# Patient Record
Sex: MALE
[De-identification: names, ages, dates, MRNs, and addresses within clinical notes are randomized per-mention and may not be internally consistent; named-entity substitution may affect disease eponyms.]

---

## 2022-11-03 ENCOUNTER — DASHBOARD ENCOUNTERS (OUTPATIENT)
Age: 82
End: 2022-11-03

## 2022-11-03 ENCOUNTER — OFFICE VISIT (OUTPATIENT)
Dept: URBAN - METROPOLITAN AREA CLINIC 128 | Facility: CLINIC | Age: 82
End: 2022-11-03
Payer: COMMERCIAL

## 2022-11-03 VITALS
HEIGHT: 70 IN | DIASTOLIC BLOOD PRESSURE: 96 MMHG | BODY MASS INDEX: 31.5 KG/M2 | SYSTOLIC BLOOD PRESSURE: 146 MMHG | WEIGHT: 220 LBS | HEART RATE: 80 BPM | TEMPERATURE: 97.8 F

## 2022-11-03 DIAGNOSIS — Z12.11 COLON CANCER SCREENING: ICD-10-CM

## 2022-11-03 DIAGNOSIS — R13.10 DYSPHAGIA: ICD-10-CM

## 2022-11-03 PROBLEM — 40739000 DYSPHAGIA: Status: ACTIVE | Noted: 2022-11-02

## 2022-11-03 PROCEDURE — 99204 OFFICE O/P NEW MOD 45 MIN: CPT | Performed by: PHYSICIAN ASSISTANT

## 2022-11-03 RX ORDER — FAMOTIDINE 20 MG/1
1 TABLET AT BEDTIME TABLET, FILM COATED ORAL ONCE A DAY
Qty: 30 TABLET | Refills: 5 | OUTPATIENT
Start: 2022-11-03

## 2022-11-03 NOTE — PHYSICAL EXAM NECK/THYROID:
normal appearance , without tenderness upon palpation, no thyroid nodule snoted, prominence to the supraclavicular region was noted bilaterally

## 2022-11-03 NOTE — HPI-OTHER HISTORIES
The patient is a new patient who elicits the following symptoms: Difficulty swallowing solids, not pills or liquids Duration of symptoms: x 6 months Location of symptoms: esophageal Associated symptoms: cough, cconstant throat clearing Patient is on lisinopril He has known hypothyroidism Prior over the counter or prescription medications: none Current stress: none Any recent weight changes: none Any recent changes in medications: none Any recent changes in diet: none Any past history of ulcers or Barretts esophagus: none Previous work up- labs, imaging: none Last EGD: 10 years ago, he was told he had a "tumor in the small intestine just below his stomach". Last colonoscopy: 2021, it was normal per patient, done in outside in practice, records were requested by GI specialist to be reviewed. Patient denies a family history of colon polyps or colon cancer or stomach cancer.

## 2022-11-04 ENCOUNTER — TELEPHONE ENCOUNTER (OUTPATIENT)
Dept: URBAN - METROPOLITAN AREA CLINIC 92 | Facility: CLINIC | Age: 82
End: 2022-11-04

## 2022-11-29 ENCOUNTER — LAB OUTSIDE AN ENCOUNTER (OUTPATIENT)
Dept: URBAN - METROPOLITAN AREA CLINIC 128 | Facility: CLINIC | Age: 82
End: 2022-11-29

## 2024-10-24 ENCOUNTER — OFFICE VISIT (OUTPATIENT)
Dept: URBAN - METROPOLITAN AREA CLINIC 128 | Facility: CLINIC | Age: 84
End: 2024-10-24
Payer: MEDICARE

## 2024-10-24 ENCOUNTER — LAB OUTSIDE AN ENCOUNTER (OUTPATIENT)
Dept: URBAN - METROPOLITAN AREA CLINIC 128 | Facility: CLINIC | Age: 84
End: 2024-10-24

## 2024-10-24 VITALS
WEIGHT: 220 LBS | HEART RATE: 76 BPM | TEMPERATURE: 97.7 F | HEIGHT: 70 IN | BODY MASS INDEX: 31.5 KG/M2 | DIASTOLIC BLOOD PRESSURE: 80 MMHG | OXYGEN SATURATION: 99 % | SYSTOLIC BLOOD PRESSURE: 120 MMHG

## 2024-10-24 DIAGNOSIS — R13.19 DYSPHAGIA, ESOPHAGEAL: ICD-10-CM

## 2024-10-24 PROCEDURE — 99214 OFFICE O/P EST MOD 30 MIN: CPT | Performed by: INTERNAL MEDICINE

## 2024-10-24 RX ORDER — FAMOTIDINE 20 MG/1
1 TABLET AT BEDTIME TABLET, FILM COATED ORAL ONCE A DAY
Qty: 30 TABLET | Refills: 5 | Status: ACTIVE | COMMUNITY
Start: 2022-11-03

## 2024-10-24 NOTE — PHYSICAL EXAM GASTROINTESTINAL
Abdomen: midline well healed surgical scar. soft nt non-distended. No hepatosplenomegaly. no guarding or rebound. Rectal: defered.

## 2024-10-24 NOTE — HPI-TODAY'S VISIT:
Quick Note:    Normal Vit B12. Confirm with pt and add to med list if she is taking oral Vit B12 daily. If so then continue current dose as B12 level is OK. Recheck Vit B12 level in 6 mos.  Noted pt has kidney stone found on recent CT, saw [address] and has appt reports dysphagia solid and liquids incl salive over the past 6 months. notes happens approx 1x per week. wife states more frequently. weight loss couple of lbs. no bleeding. h/o small intestine cancer, s/p resection.

## 2024-11-18 ENCOUNTER — OFFICE VISIT (OUTPATIENT)
Dept: URBAN - METROPOLITAN AREA SURGERY CENTER 31 | Facility: SURGERY CENTER | Age: 84
End: 2024-11-18
Payer: MEDICARE

## 2024-11-18 ENCOUNTER — CLAIMS CREATED FROM THE CLAIM WINDOW (OUTPATIENT)
Dept: URBAN - METROPOLITAN AREA CLINIC 4 | Facility: CLINIC | Age: 84
End: 2024-11-18
Payer: MEDICARE

## 2024-11-18 DIAGNOSIS — K21.9 ACID REFLUX: ICD-10-CM

## 2024-11-18 DIAGNOSIS — K22.719 BARRETT'S ESOPHAGUS WITH DYSPLASIA, UNSPECIFIED: ICD-10-CM

## 2024-11-18 DIAGNOSIS — K22.89 DILATATION OF ESOPHAGUS: ICD-10-CM

## 2024-11-18 DIAGNOSIS — K21.9 GASTRO-ESOPHAGEAL REFLUX DISEASE WITHOUT ESOPHAGITIS: ICD-10-CM

## 2024-11-18 DIAGNOSIS — R13.10 ABNORMAL DEGLUTITION: ICD-10-CM

## 2024-11-18 PROCEDURE — 88305 TISSUE EXAM BY PATHOLOGIST: CPT | Performed by: PATHOLOGY

## 2024-11-18 PROCEDURE — 88312 SPECIAL STAINS GROUP 1: CPT | Performed by: PATHOLOGY

## 2024-11-18 PROCEDURE — 00731 ANES UPR GI NDSC PX NOS: CPT | Performed by: NURSE ANESTHETIST, CERTIFIED REGISTERED

## 2024-11-18 PROCEDURE — 43239 EGD BIOPSY SINGLE/MULTIPLE: CPT | Performed by: INTERNAL MEDICINE

## 2024-11-18 RX ORDER — FAMOTIDINE 20 MG/1
1 TABLET AT BEDTIME TABLET, FILM COATED ORAL ONCE A DAY
Qty: 30 TABLET | Refills: 5 | Status: ACTIVE | COMMUNITY
Start: 2022-11-03

## 2025-01-08 ENCOUNTER — OFFICE VISIT (OUTPATIENT)
Dept: URBAN - METROPOLITAN AREA CLINIC 128 | Facility: CLINIC | Age: 85
End: 2025-01-08
Payer: COMMERCIAL

## 2025-01-08 ENCOUNTER — LAB OUTSIDE AN ENCOUNTER (OUTPATIENT)
Dept: URBAN - METROPOLITAN AREA CLINIC 128 | Facility: CLINIC | Age: 85
End: 2025-01-08

## 2025-01-08 ENCOUNTER — TELEPHONE ENCOUNTER (OUTPATIENT)
Dept: URBAN - METROPOLITAN AREA CLINIC 128 | Facility: CLINIC | Age: 85
End: 2025-01-08

## 2025-01-08 VITALS
HEART RATE: 81 BPM | HEIGHT: 70 IN | BODY MASS INDEX: 32.53 KG/M2 | WEIGHT: 227.2 LBS | DIASTOLIC BLOOD PRESSURE: 80 MMHG | TEMPERATURE: 97.7 F | SYSTOLIC BLOOD PRESSURE: 140 MMHG | OXYGEN SATURATION: 99 %

## 2025-01-08 DIAGNOSIS — K22.70 BARRETT'S ESOPHAGUS: ICD-10-CM

## 2025-01-08 DIAGNOSIS — R13.10 DYSPHAGIA: ICD-10-CM

## 2025-01-08 PROCEDURE — 99214 OFFICE O/P EST MOD 30 MIN: CPT | Performed by: INTERNAL MEDICINE

## 2025-01-08 RX ORDER — FAMOTIDINE 20 MG/1
1 TABLET AT BEDTIME TABLET, FILM COATED ORAL ONCE A DAY
Qty: 30 TABLET | Refills: 5 | Status: ACTIVE | COMMUNITY
Start: 2022-11-03

## 2025-01-08 RX ORDER — PANTOPRAZOLE SODIUM 40 MG/1
1 TABLET TABLET, DELAYED RELEASE ORAL ONCE A DAY
Qty: 90 TABLET | Refills: 3 | OUTPATIENT
Start: 2025-01-08

## 2025-01-08 NOTE — HPI-TODAY'S VISIT:
Patient presents for follow up today for short segment of Keenan's esophagus as well as dysphagia. Her patient he has had relatively few episodes of difficulty swallowing lately. However, his wife says it is more frequent than he admits to. Of note, there's been no weight loss and in fact he's gained 6 pounds since the last office visit. Recent upper endoscopy demonstrated no stricture or mass in the esophagus but did note reflux changes as well as a small area of Keenan esophagus.

## 2025-01-29 ENCOUNTER — OFFICE VISIT (OUTPATIENT)
Dept: URBAN - METROPOLITAN AREA MEDICAL CENTER 28 | Facility: MEDICAL CENTER | Age: 85
End: 2025-01-29

## 2025-05-14 ENCOUNTER — OFFICE VISIT (OUTPATIENT)
Dept: URBAN - METROPOLITAN AREA CLINIC 128 | Facility: CLINIC | Age: 85
End: 2025-05-14

## 2025-05-14 RX ORDER — PANTOPRAZOLE SODIUM 40 MG/1
1 TABLET TABLET, DELAYED RELEASE ORAL ONCE A DAY
Qty: 90 TABLET | Refills: 3 | OUTPATIENT
Start: 2025-05-14

## 2025-05-14 RX ORDER — PANTOPRAZOLE SODIUM 40 MG/1
1 TABLET TABLET, DELAYED RELEASE ORAL ONCE A DAY
Qty: 90 TABLET | Refills: 3 | Status: ACTIVE | COMMUNITY
Start: 2025-01-08

## 2025-05-14 RX ORDER — FAMOTIDINE 20 MG/1
1 TABLET AT BEDTIME TABLET, FILM COATED ORAL ONCE A DAY
Qty: 30 TABLET | Refills: 5 | Status: ACTIVE | COMMUNITY
Start: 2022-11-03